# Patient Record
Sex: FEMALE | Race: WHITE | NOT HISPANIC OR LATINO | URBAN - METROPOLITAN AREA
[De-identification: names, ages, dates, MRNs, and addresses within clinical notes are randomized per-mention and may not be internally consistent; named-entity substitution may affect disease eponyms.]

---

## 2017-03-07 ENCOUNTER — FOLLOW UP ESTABLISHED (OUTPATIENT)
Dept: URBAN - METROPOLITAN AREA CLINIC 24 | Facility: CLINIC | Age: 72
End: 2017-03-07
Payer: MEDICARE

## 2017-03-07 PROCEDURE — 92134 CPTRZ OPH DX IMG PST SGM RTA: CPT | Performed by: OPTOMETRIST

## 2017-03-07 PROCEDURE — 92014 COMPRE OPH EXAM EST PT 1/>: CPT | Performed by: OPTOMETRIST

## 2017-03-07 ASSESSMENT — INTRAOCULAR PRESSURE
OD: 16
OS: 16

## 2017-03-07 ASSESSMENT — VISUAL ACUITY
OD: 20/20
OS: 20/20

## 2019-11-05 ENCOUNTER — FOLLOW UP ESTABLISHED (OUTPATIENT)
Dept: URBAN - METROPOLITAN AREA CLINIC 24 | Facility: CLINIC | Age: 74
End: 2019-11-05
Payer: MEDICARE

## 2019-11-05 PROCEDURE — 92134 CPTRZ OPH DX IMG PST SGM RTA: CPT | Performed by: OPTOMETRIST

## 2019-11-05 PROCEDURE — 92014 COMPRE OPH EXAM EST PT 1/>: CPT | Performed by: OPTOMETRIST

## 2019-11-05 ASSESSMENT — VISUAL ACUITY
OD: 20/20
OS: 20/20

## 2019-11-05 ASSESSMENT — INTRAOCULAR PRESSURE
OD: 12
OS: 12

## 2021-05-03 ENCOUNTER — OFFICE VISIT (OUTPATIENT)
Dept: URBAN - METROPOLITAN AREA CLINIC 24 | Facility: CLINIC | Age: 76
End: 2021-05-03
Payer: MEDICARE

## 2021-05-03 DIAGNOSIS — H16.143 PUNCTATE KERATITIS, BILATERAL: ICD-10-CM

## 2021-05-03 DIAGNOSIS — Z96.1 PRESENCE OF INTRAOCULAR LENS: ICD-10-CM

## 2021-05-03 DIAGNOSIS — H43.813 VITREOUS DEGENERATION, BILATERAL: Primary | ICD-10-CM

## 2021-05-03 PROCEDURE — 92014 COMPRE OPH EXAM EST PT 1/>: CPT | Performed by: OPTOMETRIST

## 2021-05-03 ASSESSMENT — VISUAL ACUITY
OS: 20/25
OD: 20/20

## 2021-05-03 ASSESSMENT — INTRAOCULAR PRESSURE
OD: 15
OS: 15

## 2021-05-03 NOTE — IMPRESSION/PLAN
Impression: Presence of intraocular lens ; OU Restor OU, s/p Yag OU, LASIK Plan: Well positioned PC IOL(s).

## 2021-05-03 NOTE — IMPRESSION/PLAN
Impression: Vitreous degeneration, bilateral Plan: Again, there is no evidence of retinal patholgy. All signs and risks of retinal detachment or tears were discussed in detail. If pt. notices any symptoms discussed, contact office ASAP. Recommend pt. return for normal recall.

## 2021-07-07 ENCOUNTER — HOSPITAL ENCOUNTER (OUTPATIENT)
Dept: HOSPITAL 76 - DI | Age: 76
Discharge: HOME | End: 2021-07-07
Attending: NATUROPATH
Payer: MEDICARE

## 2021-07-07 ENCOUNTER — HOSPITAL ENCOUNTER (EMERGENCY)
Dept: HOSPITAL 76 - ED | Age: 76
Discharge: HOME | End: 2021-07-07
Payer: MEDICARE

## 2021-07-07 VITALS — DIASTOLIC BLOOD PRESSURE: 75 MMHG | SYSTOLIC BLOOD PRESSURE: 182 MMHG

## 2021-07-07 DIAGNOSIS — I82.451: ICD-10-CM

## 2021-07-07 DIAGNOSIS — I82.431: Primary | ICD-10-CM

## 2021-07-07 PROCEDURE — 99282 EMERGENCY DEPT VISIT SF MDM: CPT

## 2021-07-07 PROCEDURE — 99284 EMERGENCY DEPT VISIT MOD MDM: CPT

## 2021-07-07 NOTE — ULTRASOUND REPORT
PROCEDURE:  Duplex Ext Veins Right

 

INDICATIONS:  LOCALIZED EDEMA, PAIN IN RIGHT LOWER LEG

 

TECHNIQUE:  

Real-time imaging, as well as color and pulse Doppler interrogation, were performed of the lower extr
emity deep veins from the inguinal ligament to the popliteal fossa.  

 

COMPARISON:  None.

 

FINDINGS: Nonocclusive thrombus is present within the popliteal vein.

There is also nonocclusive thrombus present within peroneal vein.

Right inguinal lymph node measuring 1.9 cm. The calf veins are not well sonographically evaluated.

 

IMPRESSION:  Right lower extremity deep venous thrombosis. Attempts to contact the referring clinicia
n were unsuccessful at the time the study. The patient was immediately transferred to the emergency d
epartment for further workup and treatment.

 

Findings (including all critical results) and recommendations were personally telephoned and discusse
d with Dr. Rm in the emergency department on 07-07-21 19:47 

 

Reviewed by: Keaton Mar MD on 7/7/2021 7:51 PM PDT

Approved by: Keaton Mar MD on 7/7/2021 7:51 PM PDT

 

 

Station ID:  [id number]

## 2021-07-07 NOTE — ED PHYSICIAN DOCUMENTATION
History of Present Illness





- Stated complaint


Stated Complaint: LEG PX





- Chief complaint


Chief Complaint: Ext Problem





- History obtained from


History obtained from: Patient





- History of Present Illness


Pain level max: 5


Pain level now: 3





- Additonal information


Additional information: 





76-year-old female with right lower extremity swelling and pain for the past 

several months.  PCP ordered an outpatient ultrasound today.  Is positive for 

nonocclusive DVT of the calf veins and popliteal vein on the right.





Review of Systems


Constitutional: denies: Fever, Chills


Cardiac: denies: Chest pain / pressure


Respiratory: denies: Dyspnea





PD PAST MEDICAL HISTORY





- Past Medical History


Past Medical History: No





- Past Surgical History


Past Surgical History: Yes


General: Cholecystectomy





- Present Medications


Home Medications: 


                                Ambulatory Orders











 Medication  Instructions  Recorded  Confirmed


 


Rivaroxaban [Xarelto] 15 mg PO BID #42 tablet 07/07/21 














- Allergies


Allergies/Adverse Reactions: 


                                    Allergies











Allergy/AdvReac Type Severity Reaction Status Date / Time


 


diphenhydramine Allergy  Unknown Verified 07/07/21 19:57





[From Benadryl]     














- Social History


Does the pt smoke?: No


Smoking Status: Never smoker


Does the pt drink ETOH?: No


Does the pt have substance abuse?: No





- Immunizations


Immunizations are current?: Yes





PD ED PE NORMAL





- Vitals


Vital signs reviewed: Yes





- General


General: Alert and oriented X 3, No acute distress





- HEENT


HEENT: Moist mucous membranes





- Neck


Neck: Supple, no meningeal sign





- Cardiac


Cardiac: RRR





- Respiratory


Respiratory: No respiratory distress, Clear bilaterally





- Abdomen


Abdomen: Soft, Non tender, Non distended





- Derm


Derm: Warm and dry





- Extremities


Extremities: Other (Mildly swollen and tender right lower extremity from the 

knee to the foot.  Left lower extremity is normal.  Neurovascular intact)





- Neuro


Neuro: Alert and oriented X 3





Results





- Vitals


Vitals: 


                               Vital Signs - 24 hr











  07/07/21 07/07/21





  19:52 20:40


 


Temperature 36.3 C L 36.5 C


 


Heart Rate 72 66


 


Respiratory 16 16





Rate  


 


Blood Pressure 192/81 H 182/75 H


 


O2 Saturation 99 98








                                     Oxygen











O2 Source                      Room air

















- Rads (name of study)


  ** Duplex ultrasound right lower extremity


Radiology: Final report received, EMP read contemporaneously, See rad report 

(Right lower extremity DVT.  Nonocclusive thrombus in the popliteal vein and 

peroneal vein.  Right inguinal lymph node measures 1.9 cm)





PD MEDICAL DECISION MAKING





- ED course


Complexity details: reviewed results, re-evaluated patient, considered 

differential, d/w patient, d/w family


ED course: 





Patient is positive for DVT.  We discussed the risks and benefits of 

anticoagulation including warfarin and Lovenox versus Xarelto.  Patient elects 

Xarelto.  Understands the risks and potential nonreversibility of this 

medication.  Discussed risks of head injury, bleeding, etc.  We will start the 

patient on the Xarelto tonight and have her follow-up with her doctor for 

further dosing after the 21 days of 15 mg p.o. twice daily.  Patient counseled 

regarding signs and symptoms for which I believe and urgent re-evaluation would 

be necessary. Patient with good understanding of and agreement to plan and is 

comfortable going home at this time





This document was made in part using voice recognition software. While efforts 

are made to proofread this document, sound alike and grammatical errors may 

occur.





Departure





- Departure


Disposition: 01 Home, Self Care


Clinical Impression: 


Deep vein thrombosis


Qualifiers:


 DVT location: lower extremity Affected thrombotic vein of extremity: popliteal 

Chronicity: acute Laterality: right Qualified Code(s): I82.431 - Acute embolism 

and thrombosis of right popliteal vein





Condition: Good


Instructions:  ED DVT


Follow-Up: 


Petra Atwood ND [Physician No Access] - Within 1 week


Prescriptions: 


Rivaroxaban [Xarelto] 15 mg PO BID #42 tablet


Comments: 





We will start you on Xarelto 15 mg by mouth twice a day for 21 days.  After that

 your doctor will place you on 20 mg once a day.  They will likely want to 

repeat your ultrasound in approximately 6 weeks for repeat evaluation of the 

DVT.  Return if you have any head injury, any abdominal or chest trauma, noticed

 any bleeding in your stool.  Return if you worsen.





Ultrasound results:


Right lower extremity DVT.  Nonocclusive thrombus in the popliteal vein and 

peroneal vein.  Right inguinal lymph node measures 1.9 cm


Discharge Date/Time: 07/07/21 20:49

## 2021-07-30 ENCOUNTER — HOSPITAL ENCOUNTER (OUTPATIENT)
Dept: HOSPITAL 76 - DI | Age: 76
Discharge: HOME | End: 2021-07-30
Attending: NATUROPATH
Payer: MEDICARE

## 2021-07-30 DIAGNOSIS — I82.431: Primary | ICD-10-CM

## 2021-09-09 ENCOUNTER — HOSPITAL ENCOUNTER (OUTPATIENT)
Dept: HOSPITAL 76 - DI | Age: 76
Discharge: HOME | End: 2021-09-09
Attending: NATUROPATH
Payer: MEDICARE

## 2021-09-09 DIAGNOSIS — I82.431: Primary | ICD-10-CM

## 2022-05-02 ENCOUNTER — HOSPITAL ENCOUNTER (OUTPATIENT)
Dept: HOSPITAL 76 - DI | Age: 77
Discharge: HOME | End: 2022-05-02
Attending: NATUROPATH
Payer: MEDICARE

## 2022-05-02 DIAGNOSIS — I82.531: Primary | ICD-10-CM

## 2022-05-02 PROCEDURE — 93970 EXTREMITY STUDY: CPT

## 2022-05-02 NOTE — ULTRASOUND REPORT
PROCEDURE:  Duplex Ext Veins Bilateral

 

INDICATIONS:  JOHNNY HELM

 

TECHNIQUE:  

Real-time imaging, as well as color and pulse Doppler interrogation, were performed of the deep veins
 of both legs from the inguinal ligament to the popliteal fossa.  

 

COMPARISON:  9/9/2021

 

FINDINGS: There is no acute DVT noted. There is mild persistent nonocclusive right popliteal vein DVT
, chronic. The venous system is compressible. Color and pulse Doppler demonstrate normal phasic intra
vascular flow.  There is normal augmentation response to distal compression maneuver.  

 

IMPRESSION:  

Mild stable chronic right popliteal DVT. No acute DVT.

 

Reviewed by: Alok Hernandez MD on 5/2/2022 11:34 AM PDT

Approved by: Alok Hernandez MD on 5/2/2022 11:34 AM PDT

 

 

Station ID:  [id number]

## 2025-05-28 NOTE — ULTRASOUND REPORT
PROCEDURE:  Duplex Ext Veins Right

 

INDICATIONS:  ACUTE THROMBOSIS

 

TECHNIQUE:  

Real-time imaging, as well as color and pulse Doppler interrogation, were performed of the lower extr
emity deep veins from the inguinal ligament to the popliteal fossa.  

 

COMPARISON:  7/30/2021 and 7/7/2021

 

 

FINDINGS: Persistent thrombus noted in the right popliteal vein. Thrombus is nonocclusive in the curr
ent study.

 

 

IMPRESSION:  

 

Persistent nonocclusive deep vein thrombosis involving the right popliteal vein.

 

Reviewed by: Janice Joyner MD, PhD on 9/9/2021 2:41 PM PDT

Approved by: Janice Joyner MD, PhD on 9/9/2021 2:41 PM PDT

 

 

Station ID:  [id number]
Xray [id number]